# Patient Record
Sex: FEMALE | Race: ASIAN | NOT HISPANIC OR LATINO | Employment: OTHER | URBAN - METROPOLITAN AREA
[De-identification: names, ages, dates, MRNs, and addresses within clinical notes are randomized per-mention and may not be internally consistent; named-entity substitution may affect disease eponyms.]

---

## 2022-01-14 ENCOUNTER — TELEPHONE (OUTPATIENT)
Dept: GASTROENTEROLOGY | Facility: CLINIC | Age: 72
End: 2022-01-14

## 2022-01-14 DIAGNOSIS — B18.1 CHRONIC VIRAL HEPATITIS B WITHOUT DELTA AGENT AND WITHOUT COMA: Primary | ICD-10-CM

## 2022-01-14 RX ORDER — LEVOTHYROXINE SODIUM 50 UG/1
50 TABLET ORAL DAILY
COMMUNITY
Start: 2021-11-24

## 2022-01-14 RX ORDER — ALENDRONATE SODIUM 70 MG/1
70 TABLET ORAL
COMMUNITY
Start: 2021-11-24

## 2022-01-14 RX ORDER — EMPAGLIFLOZIN 10 MG/1
TABLET, FILM COATED ORAL
COMMUNITY
Start: 2022-01-10

## 2022-01-14 RX ORDER — LOSARTAN POTASSIUM 25 MG/1
12.5 TABLET ORAL DAILY
COMMUNITY
Start: 2021-11-24 | End: 2023-05-11

## 2022-01-14 RX ORDER — ENTECAVIR 0.5 MG/1
0.5 TABLET, FILM COATED ORAL DAILY
COMMUNITY
Start: 2021-11-29 | End: 2022-08-08

## 2022-01-14 RX ORDER — SPIRONOLACTONE 25 MG/1
12.5 TABLET ORAL DAILY
COMMUNITY
Start: 2022-01-10 | End: 2023-05-11

## 2022-01-14 RX ORDER — APIXABAN 5 MG/1
5 TABLET, FILM COATED ORAL 2 TIMES DAILY
COMMUNITY
Start: 2021-11-24

## 2022-01-14 RX ORDER — ATENOLOL 50 MG/1
25 TABLET ORAL DAILY
COMMUNITY
Start: 2021-11-24 | End: 2023-05-11

## 2022-01-14 RX ORDER — FUROSEMIDE 20 MG/1
40 TABLET ORAL DAILY
COMMUNITY
Start: 2022-01-10

## 2022-08-08 DIAGNOSIS — B18.1 CHRONIC VIRAL HEPATITIS B WITHOUT DELTA AGENT AND WITHOUT COMA: Primary | ICD-10-CM

## 2022-08-10 ENCOUNTER — TELEPHONE (OUTPATIENT)
Dept: GASTROENTEROLOGY | Facility: CLINIC | Age: 72
End: 2022-08-10

## 2022-08-10 NOTE — TELEPHONE ENCOUNTER
Pt notified of need to go to path lab for lab work. Also notified of us that has been ordered and she will be receiving a call from scheduling. Also appt will be scheduled here for f/u in 2-3 months.    
room air

## 2022-08-15 LAB
ABS NRBC COUNT: 0 X 10 3/UL (ref 0–0.01)
ABSOLUTE BASOPHIL: 0.05 X 10 3/UL (ref 0–0.22)
ABSOLUTE EOSINOPHIL: 0.15 X 10 3/UL (ref 0.04–0.54)
ABSOLUTE IMMATURE GRAN: 0 X 10 3/UL (ref 0–0.04)
ABSOLUTE LYMPHOCYTE: 0.75 X 10 3/UL (ref 0.86–4.75)
ABSOLUTE MONOCYTE: 0.39 X 10 3/UL (ref 0.22–1.08)
ALBUMIN SERPL-MCNC: 4.8 G/DL (ref 3.5–5.2)
ALBUMIN/GLOB SERPL ELPH: 1.5 {RATIO} (ref 1–2.7)
ALP ISOS SERPL LEV INH-CCNC: 105 U/L (ref 35–105)
ALPHA FETOPROTEIN MATERNAL: 3.97 NG/ML (ref 0–8.3)
ALT (SGPT): 19 U/L (ref 0–33)
ANION GAP SERPL CALC-SCNC: 9 MMOL/L (ref 8–17)
AST SERPL-CCNC: 31 U/L (ref 0–32)
BASOPHILS NFR BLD: 1.5 % (ref 0.2–1.2)
BILIRUBIN, TOTAL: 0.6 MG/DL (ref 0–1.2)
BUN/CREAT SERPL: 18.4 (ref 6–20)
CALCIUM SERPL-MCNC: 9.5 MG/DL (ref 8.6–10.2)
CARBON DIOXIDE, CO2: 33 MMOL/L (ref 22–29)
CHLORIDE: 96 MMOL/L (ref 98–107)
CREAT SERPL-MCNC: 0.68 MG/DL (ref 0.5–0.9)
EOSINOPHIL NFR BLD: 4.6 % (ref 0.7–7)
GFR ESTIMATION: 85.29
GLOBULIN: 3.2 G/DL (ref 1.5–4.5)
GLUCOSE: 108 MG/DL (ref 82–115)
HCT VFR BLD AUTO: 42.6 % (ref 37–47)
HGB BLD-MCNC: 14.4 G/DL (ref 12–16)
IMMATURE GRANULOCYTES: 0 % (ref 0–0.5)
LYMPHOCYTES NFR BLD: 23 % (ref 19.3–53.1)
MCH RBC QN AUTO: 32.6 PG (ref 27–32)
MCHC RBC AUTO-ENTMCNC: 33.8 G/DL (ref 32–36)
MCV RBC AUTO: 96.4 FL (ref 82–100)
MONOCYTES NFR BLD: 12 % (ref 4.7–12.5)
NEUTROPHILS # BLD AUTO: 1.92 X 10 3/UL (ref 2.15–7.56)
NEUTROPHILS NFR BLD: 58.9 % (ref 34–71.1)
NUCLEATED RED BLOOD CELLS: 0 /100 WBC (ref 0–0.2)
PLATELET # BLD AUTO: 229 X 10 3/UL (ref 135–400)
POTASSIUM: 4.3 MMOL/L (ref 3.5–5.1)
PROT SNV-MCNC: 8 G/DL (ref 6.4–8.3)
RBC # BLD AUTO: 4.42 X 10 6/UL (ref 4.2–5.4)
RDW-SD: 51.4 FL (ref 37–54)
SODIUM: 138 MMOL/L (ref 136–145)
UREA NITROGEN (BUN): 12.5 MG/DL (ref 8–23)
WBC # BLD: 3.26 X 10 3/UL (ref 4.3–10.8)

## 2022-08-15 NOTE — PROGRESS NOTES
Call patient-CBC, CMP, AFP ok-awaiting U/S of abdomen-repeat CBC, CMP in  4 mo.  Repeat AFP and U/S in 6 mo.

## 2022-08-16 DIAGNOSIS — B18.1 CHRONIC VIRAL HEPATITIS B WITHOUT DELTA AGENT AND WITHOUT COMA: Primary | ICD-10-CM

## 2022-10-17 ENCOUNTER — OFFICE VISIT (OUTPATIENT)
Dept: GASTROENTEROLOGY | Facility: CLINIC | Age: 72
End: 2022-10-17
Payer: MEDICARE

## 2022-10-17 VITALS
BODY MASS INDEX: 16.76 KG/M2 | SYSTOLIC BLOOD PRESSURE: 121 MMHG | WEIGHT: 98.19 LBS | OXYGEN SATURATION: 95 % | TEMPERATURE: 98 F | HEART RATE: 50 BPM | HEIGHT: 64 IN | DIASTOLIC BLOOD PRESSURE: 82 MMHG

## 2022-10-17 DIAGNOSIS — Z12.11 SCREENING FOR COLON CANCER: ICD-10-CM

## 2022-10-17 DIAGNOSIS — B18.1 CHRONIC VIRAL HEPATITIS B WITHOUT DELTA AGENT AND WITHOUT COMA: Primary | ICD-10-CM

## 2022-10-17 PROCEDURE — 99214 OFFICE O/P EST MOD 30 MIN: CPT | Mod: S$GLB,,, | Performed by: INTERNAL MEDICINE

## 2022-10-17 PROCEDURE — 99214 PR OFFICE/OUTPT VISIT, EST, LEVL IV, 30-39 MIN: ICD-10-PCS | Mod: S$GLB,,, | Performed by: INTERNAL MEDICINE

## 2022-10-17 RX ORDER — POTASSIUM CHLORIDE 750 MG/1
TABLET, EXTENDED RELEASE ORAL
COMMUNITY
Start: 2022-09-20

## 2022-10-17 RX ORDER — SACUBITRIL AND VALSARTAN 24; 26 MG/1; MG/1
TABLET, FILM COATED ORAL
COMMUNITY
Start: 2022-08-15

## 2022-10-17 NOTE — PATIENT INSTRUCTIONS
Will repeat ultrasound and blood work in 4 months.   Let our office know if you would like to proceed with colonoscopy.

## 2022-10-17 NOTE — LETTER
October 17, 2022        Marbin Santamaria MD  [address] [facility]  [address]  [facility]  [place] [address]             [place] [facility]  [address] [address]  Phone: [phone]  Fax: [phone]   Patient: Louisa Feliciano   MR Number: [id number]   YOB: 1950   Date of Visit: 10/17/2022       Dear Dr. Santamaria:    Thank you for referring Louisa Feliciano to me for evaluation. Attached you will find relevant portions of my assessment and plan of care.    If you have questions, please do not hesitate to call me. I look forward to following Louisa Feliciano along with you.    Sincerely,      Daquan Schreiber MD            CC  No Recipients    Enclosure

## 2022-10-17 NOTE — PROGRESS NOTES
Clinic Note    Reason for visit:  The primary encounter diagnosis was Chronic viral hepatitis B without delta agent and without coma. A diagnosis of Screening for colon cancer was also pertinent to this visit.    PCP: Marbin Santamaria       HPI:  This is a 72 y.o. female who is here for a follow up. Patient with chronic Hep B, on entecavir for life. She reports doing well. No abdominal pain or blood in stool. She has aortic valve leaking and will have valve replacement.     She is due for colonoscopy-last one was incomplete 2016.    8/24/2022: US normal, AFP normal. Repeat US and AFP in 6 months  8/15/2022: CBC, CMP, AFP ok-repeat CBC, CMP in  4 mo.  Repeat AFP and U/S in 6 mo.    Review of Systems   Constitutional:  Negative for chills, diaphoresis, fatigue, fever and unexpected weight change.   HENT:  Negative for mouth sores, nosebleeds, postnasal drip, sore throat, trouble swallowing and voice change.    Eyes:  Negative for pain, discharge and eye dryness.   Respiratory:  Negative for apnea, cough, choking, chest tightness, shortness of breath and wheezing.    Cardiovascular:  Negative for chest pain, palpitations, leg swelling and claudication.   Gastrointestinal:  Negative for abdominal distention, abdominal pain, anal bleeding, blood in stool, change in bowel habit, constipation, diarrhea, nausea, rectal pain, vomiting, reflux, fecal incontinence and change in bowel habit.   Genitourinary:  Negative for bladder incontinence, difficulty urinating, dysuria, flank pain, frequency and hematuria.   Musculoskeletal:  Negative for arthralgias, back pain, joint swelling and joint deformity.   Integumentary:  Negative for color change, rash and wound.   Allergic/Immunologic: Negative for environmental allergies and food allergies.   Neurological:  Negative for seizures, facial asymmetry, speech difficulty, weakness, headaches and memory loss.   Hematological:  Negative for adenopathy. Does not bruise/bleed easily.  "  Psychiatric/Behavioral:  Negative for agitation, behavioral problems, confusion, hallucinations and sleep disturbance.       Past Medical History:   Diagnosis Date    Back complaints     Heart valve problem     Hypothyroidism, unspecified     Osteoporosis     Unspecified viral hepatitis B without hepatic coma      Past Surgical History:   Procedure Laterality Date    CHOLECYSTECTOMY      COLONOSCOPY      INGUINAL HERNIA REPAIR      UPPER GASTROINTESTINAL ENDOSCOPY       Family History   Problem Relation Age of Onset    Ulcerative colitis Neg Hx     Colon cancer Neg Hx     Crohn's disease Neg Hx     Stomach cancer Neg Hx     Pancreatic cancer Neg Hx     Throat cancer Neg Hx      Social History     Tobacco Use    Smoking status: Never     Passive exposure: Never    Smokeless tobacco: Never   Substance Use Topics    Alcohol use: Never    Drug use: Never     Review of patient's allergies indicates:  No Known Allergies     Medication List with Changes/Refills   Current Medications    ALENDRONATE (FOSAMAX) 70 MG TABLET    Take 70 mg by mouth every 7 days.    ATENOLOL (TENORMIN) 50 MG TABLET    Take 25 mg by mouth once daily.    ELIQUIS 5 MG TAB    Take 5 mg by mouth 2 (two) times daily.    ENTECAVIR (BARACLUDE) 0.5 MG TAB    TAKE ONE TABLET BY MOUTH ONE TIME DAILY    ENTRESTO 24-26 MG PER TABLET    TAKE 1/2 (ONE-HALF) TABLET BY MOUTH TWICE DAILY    FUROSEMIDE (LASIX) 20 MG TABLET    Take 40 mg by mouth once daily.    JARDIANCE 10 MG TABLET    TAKE 1 TABLET BY MOUTH IN THE MORNING FOR 90 DAYS    LEVOTHYROXINE (SYNTHROID) 50 MCG TABLET    Take 50 mcg by mouth once daily.    LOSARTAN (COZAAR) 25 MG TABLET    Take 12.5 mg by mouth once daily.    POTASSIUM CHLORIDE (KLOR-CON) 10 MEQ TBSR        SPIRONOLACTONE (ALDACTONE) 25 MG TABLET    Take 12.5 mg by mouth once daily.         Vital Signs:  /82   Pulse (!) 50   Temp 98.1 °F (36.7 °C)   Ht 5' 4" (1.626 m)   Wt 44.5 kg (98 lb 3.2 oz)   SpO2 95%   BMI 16.86 kg/m²   "       Physical Exam  Vitals reviewed.   Constitutional:       General: She is awake. She is not in acute distress.     Appearance: Normal appearance. She is well-developed. She is not ill-appearing, toxic-appearing or diaphoretic.      Comments: Back brace   HENT:      Head: Normocephalic and atraumatic.      Nose: Nose normal.      Mouth/Throat:      Mouth: Mucous membranes are moist.      Pharynx: Oropharynx is clear. No oropharyngeal exudate or posterior oropharyngeal erythema.   Eyes:      General: Lids are normal. Gaze aligned appropriately. No scleral icterus.        Right eye: No discharge.         Left eye: No discharge.      Extraocular Movements: Extraocular movements intact.      Conjunctiva/sclera: Conjunctivae normal.   Neck:      Trachea: Trachea normal.   Cardiovascular:      Rate and Rhythm: Normal rate and regular rhythm.      Pulses:           Radial pulses are 2+ on the right side and 2+ on the left side.   Pulmonary:      Effort: Pulmonary effort is normal. No respiratory distress.      Breath sounds: Normal breath sounds. No stridor. No wheezing or rhonchi.   Chest:      Chest wall: No tenderness.   Abdominal:      General: Bowel sounds are normal. There is no distension.      Palpations: Abdomen is soft. There is no fluid wave, hepatomegaly or mass.      Tenderness: There is no abdominal tenderness. There is no guarding or rebound.   Musculoskeletal:         General: No tenderness or deformity.      Cervical back: Full passive range of motion without pain and neck supple. No tenderness.      Right lower leg: No edema.      Left lower leg: No edema.   Lymphadenopathy:      Cervical: No cervical adenopathy.   Skin:     General: Skin is warm and dry.      Capillary Refill: Capillary refill takes less than 2 seconds.      Coloration: Skin is not cyanotic, jaundiced or pale.      Findings: No rash.   Neurological:      General: No focal deficit present.      Mental Status: She is alert and oriented  to person, place, and time.      Cranial Nerves: No facial asymmetry.      Motor: No tremor.   Psychiatric:         Attention and Perception: Attention normal.         Mood and Affect: Mood and affect normal.         Speech: Speech normal.         Behavior: Behavior normal. Behavior is cooperative.          All of the data above and below has been reviewed by myself and any further interpretations will be reflected in the assessment and plan.   The data includes review of external notes, and independent interpretation of lab results, procedures, x-rays, and imaging reports.      Assessment:  Chronic viral hepatitis B without delta agent and without coma    Screening for colon cancer    Patient states she would like to defer screening colonoscopy at this time due to her back having pain.      Recommendations:  Will repeat ultrasound and blood work in 4 months.   Let our office know if you would like to proceed with colonoscopy.     Risks, benefits, and alternatives of medical management, any associated procedures, and/or treatment discussed with the patient. Patient given opportunity to ask questions and voices understanding. Patient has elected to proceed with the recommended care modalities as discussed.    Follow up in about 6 months (around 4/17/2023).    Order summary:  No orders of the defined types were placed in this encounter.       Instructed patient to notify my office if they have not been contacted within two weeks after any procedures, submitting any samples (biopsies, blood, stool, urine, etc.) or after any imaging (X-ray, CT, MRI, etc.).     Daquan Schreiber MD    This document may have been created using a voice recognition transcribing system. Incorrect words or phrases may have been missed during proofreading. Please interpret accordingly or contact me for clarification.

## 2022-11-16 NOTE — TELEPHONE ENCOUNTER
----- Message from Chrissy Aguero sent at 11/16/2022  2:44 PM CST -----  Regarding: med refill  Contact: Domniique/daughter  Type:  RX Refill Request    Who Called:  Dominique/daughter   Refill or New Rx: refill   RX Name and Strength: entecavir (BARACLUDE) 0.5 MG Tab  How is the patient currently taking it? (ex. 1XDay): 1x day   Is this a 30 day or 90 day RX:90 day   Preferred Pharmacy with phone number:     [facility] #[id number] - [facility] [address] [address]  Phone: [phone] Fax: [phone]    Local or Mail Order: local   Ordering Provider: Candie   Would the patient rather a call back or a response via [facility]?  Call back   Best Call Back Number:[phone]  Additional Information:

## 2022-11-17 RX ORDER — ENTECAVIR 0.5 MG/1
0.5 TABLET, FILM COATED ORAL DAILY
Qty: 90 TABLET | Refills: 3 | Status: SHIPPED | OUTPATIENT
Start: 2022-11-17 | End: 2023-11-06 | Stop reason: SDUPTHER

## 2023-05-11 ENCOUNTER — OFFICE VISIT (OUTPATIENT)
Dept: GASTROENTEROLOGY | Facility: CLINIC | Age: 73
End: 2023-05-11
Payer: MEDICARE

## 2023-05-11 VITALS
BODY MASS INDEX: 17.07 KG/M2 | WEIGHT: 100 LBS | HEIGHT: 64 IN | DIASTOLIC BLOOD PRESSURE: 64 MMHG | HEART RATE: 69 BPM | SYSTOLIC BLOOD PRESSURE: 119 MMHG

## 2023-05-11 DIAGNOSIS — B18.1 CHRONIC VIRAL HEPATITIS B WITHOUT DELTA AGENT AND WITHOUT COMA: ICD-10-CM

## 2023-05-11 DIAGNOSIS — Z12.11 SCREENING FOR COLON CANCER: Primary | ICD-10-CM

## 2023-05-11 PROCEDURE — 99214 OFFICE O/P EST MOD 30 MIN: CPT | Mod: S$GLB,,, | Performed by: INTERNAL MEDICINE

## 2023-05-11 PROCEDURE — 99214 PR OFFICE/OUTPT VISIT, EST, LEVL IV, 30-39 MIN: ICD-10-PCS | Mod: S$GLB,,, | Performed by: INTERNAL MEDICINE

## 2023-05-11 RX ORDER — TRIAMCINOLONE ACETONIDE 1 MG/G
CREAM TOPICAL
COMMUNITY
Start: 2022-12-30

## 2023-05-11 RX ORDER — CLOTRIMAZOLE AND BETAMETHASONE DIPROPIONATE 10; .64 MG/G; MG/G
CREAM TOPICAL
COMMUNITY
Start: 2023-01-05

## 2023-05-11 NOTE — PROGRESS NOTES
Clinic Note    Reason for visit:  The primary encounter diagnosis was Screening for colon cancer. A diagnosis of Chronic viral hepatitis B without delta agent and without coma was also pertinent to this visit.    PCP: Marbin Santamaria       HPI:  This is a 73 y.o. female here for follow up. Chronic HBV on enecavir.    She is due for colonoscopy-last one was incomplete 2016.    8/24/2022: US normal, AFP normal. Repeat US and AFP in 6 months  8/15/2022: CBC, CMP, AFP ok-repeat CBC, CMP in  4 mo.  Repeat AFP and U/S in 6 mos.    Review of Systems   Constitutional:  Negative for fatigue, fever and unexpected weight change.   HENT:  Negative for mouth sores, postnasal drip, sore throat and trouble swallowing.    Eyes:  Negative for pain, discharge and eye dryness.   Respiratory:  Negative for apnea, cough, choking, chest tightness, shortness of breath and wheezing.    Cardiovascular:  Negative for chest pain, palpitations and leg swelling.   Gastrointestinal:  Negative for abdominal distention, abdominal pain, anal bleeding, blood in stool, change in bowel habit, constipation, diarrhea, nausea, rectal pain, vomiting, reflux, fecal incontinence and change in bowel habit.   Genitourinary:  Negative for bladder incontinence, dysuria and hematuria.   Musculoskeletal:  Negative for arthralgias, back pain and joint swelling.   Integumentary:  Negative for color change and rash.   Allergic/Immunologic: Negative for environmental allergies and food allergies.   Neurological:  Negative for seizures and headaches.   Hematological:  Negative for adenopathy. Does not bruise/bleed easily.      Past Medical History:   Diagnosis Date    Back complaints     Heart valve problem     Hypothyroidism, unspecified     Osteoporosis     Unspecified viral hepatitis B without hepatic coma      Past Surgical History:   Procedure Laterality Date    CHOLECYSTECTOMY      COLONOSCOPY      INGUINAL HERNIA REPAIR      STENT, AORTA      UPPER  "GASTROINTESTINAL ENDOSCOPY       Family History   Problem Relation Age of Onset    Ulcerative colitis Neg Hx     Colon cancer Neg Hx     Crohn's disease Neg Hx     Stomach cancer Neg Hx     Pancreatic cancer Neg Hx     Throat cancer Neg Hx      Social History     Tobacco Use    Smoking status: Never     Passive exposure: Never    Smokeless tobacco: Never   Substance Use Topics    Alcohol use: Never    Drug use: Never     Review of patient's allergies indicates:  No Known Allergies     Medication List with Changes/Refills   Current Medications    ALENDRONATE (FOSAMAX) 70 MG TABLET    Take 70 mg by mouth every 7 days.    CLOTRIMAZOLE-BETAMETHASONE 1-0.05% (LOTRISONE) CREAM    APPLY TOPICALLY TWICE A DAY AS NEEDED    ELIQUIS 5 MG TAB    Take 5 mg by mouth 2 (two) times daily.    ENTECAVIR (BARACLUDE) 0.5 MG TAB    Take 1 tablet (0.5 mg total) by mouth once daily.    ENTRESTO 24-26 MG PER TABLET    TAKE 1/2 (ONE-HALF) TABLET BY MOUTH TWICE DAILY    FUROSEMIDE (LASIX) 20 MG TABLET    Take 40 mg by mouth once daily.    JARDIANCE 10 MG TABLET    TAKE 1 TABLET BY MOUTH IN THE MORNING FOR 90 DAYS    LEVOTHYROXINE (SYNTHROID) 50 MCG TABLET    Take 50 mcg by mouth once daily.    POTASSIUM CHLORIDE (KLOR-CON) 10 MEQ TBSR        TRIAMCINOLONE ACETONIDE 0.1% (KENALOG) 0.1 % CREAM    APPLY CREAM EXTERNALLY TO AFFECTED AREA TWICE DAILY   Discontinued Medications    ATENOLOL (TENORMIN) 50 MG TABLET    Take 25 mg by mouth once daily.    ENTECAVIR (BARACLUDE) 0.5 MG TAB    TAKE ONE TABLET BY MOUTH ONE TIME DAILY    LOSARTAN (COZAAR) 25 MG TABLET    Take 12.5 mg by mouth once daily.    SPIRONOLACTONE (ALDACTONE) 25 MG TABLET    Take 12.5 mg by mouth once daily.         Vital Signs:  /64   Pulse 69   Ht 5' 4" (1.626 m)   Wt 45.4 kg (100 lb)   BMI 17.16 kg/m²         Physical Exam  Vitals reviewed.   Constitutional:       General: She is awake. She is not in acute distress.     Appearance: Normal appearance. She is " well-developed. She is not ill-appearing, toxic-appearing or diaphoretic.   HENT:      Head: Normocephalic and atraumatic.      Nose: Nose normal.      Mouth/Throat:      Mouth: Mucous membranes are moist.      Pharynx: Oropharynx is clear. No oropharyngeal exudate or posterior oropharyngeal erythema.   Eyes:      General: Lids are normal. Gaze aligned appropriately. No scleral icterus.        Right eye: No discharge.         Left eye: No discharge.      Conjunctiva/sclera: Conjunctivae normal.   Neck:      Trachea: Trachea normal.   Cardiovascular:      Rate and Rhythm: Normal rate and regular rhythm.      Pulses:           Radial pulses are 2+ on the right side and 2+ on the left side.   Pulmonary:      Effort: Pulmonary effort is normal. No respiratory distress.      Breath sounds: No stridor. No wheezing.   Chest:      Chest wall: No tenderness.   Abdominal:      General: Bowel sounds are normal. There is no distension.      Palpations: Abdomen is soft. There is no fluid wave, hepatomegaly or mass.      Tenderness: There is no abdominal tenderness. There is no guarding or rebound.   Musculoskeletal:         General: No tenderness or deformity.      Cervical back: Full passive range of motion without pain and neck supple. No tenderness.      Right lower leg: No edema.      Left lower leg: No edema.   Lymphadenopathy:      Cervical: No cervical adenopathy.   Skin:     General: Skin is warm and dry.      Capillary Refill: Capillary refill takes less than 2 seconds.      Coloration: Skin is not cyanotic, jaundiced or pale.   Neurological:      General: No focal deficit present.      Mental Status: She is alert and oriented to person, place, and time.      Motor: No tremor.   Psychiatric:         Attention and Perception: Attention normal.         Mood and Affect: Mood and affect normal.         Speech: Speech normal.         Behavior: Behavior normal. Behavior is cooperative.          All of the data above and below  has been reviewed by myself and any further interpretations will be reflected in the assessment and plan.   The data includes review of external notes, and independent interpretation of lab results, procedures, x-rays, and imaging reports.      Assessment:  Screening for colon cancer    Chronic viral hepatitis B without delta agent and without coma    Chronic HBV- continue w/ entecavir daily. Due for labs work for HBV monitoring/HCC screening. Pt. Does not understand reasoning for labs and US. Sent education/instructions on D/C paperwork :  Needs to have labwork to assess if hepatitis B is active. It is important to have labwork as well to see if liver is functioning correctly. We will also do an Abdominal ultrasound to evaluate liver. We do this so we can see if you have any damage to your liver and to look for any time of liver cancers. Because you have hepatitis B you are increased risk for both of these.    Overdue for Colonoscopy- patient refuses at this time but will call us when ready to get done.     Recommendations:    Continue entecavir  Complete labs and US    Risks, benefits, and alternatives of medical management, any associated procedures, and/or treatment discussed with the patient. Patient given opportunity to ask questions and voices understanding. Patient has elected to proceed with the recommended care modalities as discussed.    Follow up in about 6 months (around 11/11/2023).    Order summary:  No orders of the defined types were placed in this encounter.       Instructed patient to notify my office if they have not been contacted within two weeks after any procedures, submitting any samples (biopsies, blood, stool, urine, etc.) or after any imaging (X-ray, CT, MRI, etc.).     Daquan Schreiber MD    This document may have been created using a voice recognition transcribing system. Incorrect words or phrases may have been missed during proofreading. Please interpret accordingly or contact me for  clarification.

## 2023-05-11 NOTE — PATIENT INSTRUCTIONS
Continue entecavir  Complete labs and US- Needs to have labwork to assess if hepatitis B is active. It is important to have labwork as well to see if liver is functioning correctly. We will also do an Abdominal ultrasound to evaluate liver. We do this so we can see if you have any damage to your liver and to look for any time of liver cancers. Because you have hepatitis B you are increased risk for both of these.  Call us when ready to have Colonsocopy    Please notify my office if you have not been contacted within two weeks after any procedures, submitting any samples (biopsies, blood, stool, urine, etc.) or after any imaging (X-ray, CT, MRI, etc.).

## 2023-05-12 LAB
ABS NRBC COUNT: 0 X 10 3/UL (ref 0–0.01)
ABSOLUTE BASOPHIL: 0.03 X 10 3/UL (ref 0–0.22)
ABSOLUTE EOSINOPHIL: 0.19 X 10 3/UL (ref 0.04–0.54)
ABSOLUTE IMMATURE GRAN: 0.01 X 10 3/UL (ref 0–0.04)
ABSOLUTE LYMPHOCYTE: 0.92 X 10 3/UL (ref 0.86–4.75)
ABSOLUTE MONOCYTE: 0.46 X 10 3/UL (ref 0.22–1.08)
ALBUMIN SERPL-MCNC: 4.9 G/DL (ref 3.5–5.2)
ALBUMIN/GLOB SERPL ELPH: 1.3 {RATIO} (ref 1–2.7)
ALP ISOS SERPL LEV INH-CCNC: 84 U/L (ref 35–105)
ALPHA FETOPROTEIN MATERNAL: 3.87 NG/ML (ref 0–8.3)
ALT (SGPT): 21 U/L (ref 0–33)
ANION GAP SERPL CALC-SCNC: 13 MMOL/L (ref 8–17)
AST SERPL-CCNC: 33 U/L (ref 0–32)
BASOPHILS NFR BLD: 0.8 % (ref 0.2–1.2)
BILIRUBIN, TOTAL: 0.55 MG/DL (ref 0–1.2)
BUN/CREAT SERPL: 29.5 (ref 6–20)
CALCIUM SERPL-MCNC: 9.7 MG/DL (ref 8.6–10.2)
CARBON DIOXIDE, CO2: 28 MMOL/L (ref 22–29)
CHLORIDE: 97 MMOL/L (ref 98–107)
CREAT SERPL-MCNC: 0.66 MG/DL (ref 0.5–0.9)
EOSINOPHIL NFR BLD: 5.1 % (ref 0.7–7)
GFR ESTIMATION: 90.22
GLOBULIN: 3.7 G/DL (ref 1.5–4.5)
GLUCOSE: 94 MG/DL (ref 82–115)
HBV SURFACE AG SERPL QL IA: REACTIVE
HCT VFR BLD AUTO: 45.1 % (ref 37–47)
HEP BSAG CONFIRMATION: ABNORMAL
HGB BLD-MCNC: 14.5 G/DL (ref 12–16)
IMMATURE GRANULOCYTES: 0.3 % (ref 0–0.5)
LYMPHOCYTES NFR BLD: 24.5 % (ref 19.3–53.1)
MCH RBC QN AUTO: 31.1 PG (ref 27–32)
MCHC RBC AUTO-ENTMCNC: 32.2 G/DL (ref 32–36)
MCV RBC AUTO: 96.8 FL (ref 82–100)
MONOCYTES NFR BLD: 12.3 % (ref 4.7–12.5)
NEUTROPHILS # BLD AUTO: 2.14 X 10 3/UL (ref 2.15–7.56)
NEUTROPHILS NFR BLD: 57 % (ref 34–71.1)
NUCLEATED RED BLOOD CELLS: 0 /100 WBC (ref 0–0.2)
PLATELET # BLD AUTO: 237 X 10 3/UL (ref 135–400)
POTASSIUM: 4.2 MMOL/L (ref 3.5–5.1)
PROT SNV-MCNC: 8.6 G/DL (ref 6.4–8.3)
RBC # BLD AUTO: 4.66 X 10 6/UL (ref 4.2–5.4)
RDW-SD: 52.7 FL (ref 37–54)
SODIUM: 138 MMOL/L (ref 136–145)
UREA NITROGEN (BUN): 19.5 MG/DL (ref 8–23)
WBC # BLD: 3.75 X 10 3/UL (ref 4.3–10.8)

## 2023-05-15 NOTE — PROGRESS NOTES
Call with results,afp normal, CBC normal, CMP ok-mild increase in AST-33-normal is [id number] CBC, CMP, AFP in 6mo. Along with abdominal U/S-pending current abdominal U/S.

## 2023-05-23 ENCOUNTER — TELEPHONE (OUTPATIENT)
Dept: GASTROENTEROLOGY | Facility: CLINIC | Age: 73
End: 2023-05-23
Payer: MEDICARE

## 2023-05-23 NOTE — TELEPHONE ENCOUNTER
I spoke to daughter and gave test results and instructed to have patient lynn las in 6 months.  Is scheduled for /S Thursday.

## 2023-05-23 NOTE — TELEPHONE ENCOUNTER
----- Message from aDquan Schreiber MD sent at 5/15/2023 10:06 AM CDT -----  Call with results,afp normal, CBC normal, CMP ok-mild increase in AST-33-normal is [id number] CBC, CMP, AFP in 6mo. Along with abdominal U/S-pending current abdominal U/S.

## 2023-06-05 ENCOUNTER — TELEPHONE (OUTPATIENT)
Dept: GASTROENTEROLOGY | Facility: CLINIC | Age: 73
End: 2023-06-05
Payer: MEDICARE

## 2023-06-05 NOTE — TELEPHONE ENCOUNTER
----- Message from Daquan Schreiber MD sent at 6/5/2023  7:25 AM CDT -----  Call with results,U/S shows liver ok-repeat U/S in 6mo. Along with AFP.

## 2023-06-05 NOTE — TELEPHONE ENCOUNTER
Patient given results per Dr. Schreiber's chart remarks. Patient gave verbal understanding. Will repeat in 6 months along with AFP. -KG, CMA

## 2023-11-06 ENCOUNTER — OFFICE VISIT (OUTPATIENT)
Dept: GASTROENTEROLOGY | Facility: CLINIC | Age: 73
End: 2023-11-06
Payer: MEDICARE

## 2023-11-06 VITALS
HEIGHT: 64 IN | BODY MASS INDEX: 17.18 KG/M2 | OXYGEN SATURATION: 97 % | HEART RATE: 62 BPM | DIASTOLIC BLOOD PRESSURE: 70 MMHG | SYSTOLIC BLOOD PRESSURE: 120 MMHG | RESPIRATION RATE: 16 BRPM | TEMPERATURE: 98 F | WEIGHT: 100.63 LBS

## 2023-11-06 DIAGNOSIS — B18.1 CHRONIC VIRAL HEPATITIS B WITHOUT DELTA AGENT AND WITHOUT COMA: Primary | ICD-10-CM

## 2023-11-06 DIAGNOSIS — Z12.11 SCREENING FOR COLON CANCER: ICD-10-CM

## 2023-11-06 PROCEDURE — 99214 OFFICE O/P EST MOD 30 MIN: CPT | Mod: S$GLB,,, | Performed by: INTERNAL MEDICINE

## 2023-11-06 PROCEDURE — 99214 PR OFFICE/OUTPT VISIT, EST, LEVL IV, 30-39 MIN: ICD-10-PCS | Mod: S$GLB,,, | Performed by: INTERNAL MEDICINE

## 2023-11-06 RX ORDER — ENTECAVIR 0.5 MG/1
0.5 TABLET, FILM COATED ORAL DAILY
Qty: 90 TABLET | Refills: 3 | Status: SHIPPED | OUTPATIENT
Start: 2023-11-06 | End: 2024-11-05

## 2023-11-06 NOTE — PROGRESS NOTES
Clinic Note    Reason for visit:  The primary encounter diagnosis was Chronic viral hepatitis B without delta agent and without coma. A diagnosis of Screening for colon cancer was also pertinent to this visit.    PCP: Marbin Santamaria       HPI:  This is a 73 y.o. female here for follow up. - Jenny used. Patient with chronic HBV on entacavir. Last visit colonoscopy was ordered but not done. She reports itching of her hands and back. She denies confusion, abdominal pain, chest pain.  Patient would like to not do a colonoscopy.     She is due for colonoscopy-last one was incomplete 2016 5/25/2023: ABD US- normal liver    5/11/2023: WBC 3.75L, AST 33H, ALT 21, Tbil 0.5 AFP WNL. HepBsAG +, HepBeAG- neg., HepBDNA <10- not detected.    Review of Systems   Constitutional:  Negative for fatigue, fever and unexpected weight change.   HENT:  Negative for mouth sores, postnasal drip, sore throat and trouble swallowing.    Eyes:  Negative for pain, discharge and eye dryness.   Respiratory:  Negative for apnea, cough, choking, chest tightness, shortness of breath and wheezing.    Cardiovascular:  Negative for chest pain, palpitations and leg swelling.   Gastrointestinal:  Negative for abdominal distention, abdominal pain, anal bleeding, blood in stool, change in bowel habit, constipation, diarrhea, nausea, rectal pain, vomiting, reflux and fecal incontinence.   Genitourinary:  Negative for bladder incontinence, dysuria and hematuria.   Musculoskeletal:  Negative for arthralgias, back pain and joint swelling.   Integumentary:  Negative for color change and rash.   Allergic/Immunologic: Negative for environmental allergies and food allergies.   Neurological:  Negative for seizures and headaches.   Hematological:  Negative for adenopathy. Does not bruise/bleed easily.        Past Medical History:   Diagnosis Date    Back complaints     Heart valve problem     Hypothyroidism, unspecified     Osteoporosis      "Unspecified viral hepatitis B without hepatic coma      Past Surgical History:   Procedure Laterality Date    CHOLECYSTECTOMY      COLONOSCOPY      INGUINAL HERNIA REPAIR      STENT, AORTA      UPPER GASTROINTESTINAL ENDOSCOPY       Family History   Problem Relation Age of Onset    Ulcerative colitis Neg Hx     Colon cancer Neg Hx     Crohn's disease Neg Hx     Stomach cancer Neg Hx     Pancreatic cancer Neg Hx     Throat cancer Neg Hx      Social History     Tobacco Use    Smoking status: Never     Passive exposure: Never    Smokeless tobacco: Never   Substance Use Topics    Alcohol use: Never    Drug use: Never     Review of patient's allergies indicates:  No Known Allergies     Medication List with Changes/Refills   Current Medications    ALENDRONATE (FOSAMAX) 70 MG TABLET    Take 70 mg by mouth every 7 days.    CLOTRIMAZOLE-BETAMETHASONE 1-0.05% (LOTRISONE) CREAM    APPLY TOPICALLY TWICE A DAY AS NEEDED    ELIQUIS 5 MG TAB    Take 5 mg by mouth 2 (two) times daily.    ENTRESTO 24-26 MG PER TABLET    TAKE 1/2 (ONE-HALF) TABLET BY MOUTH TWICE DAILY    FUROSEMIDE (LASIX) 20 MG TABLET    Take 40 mg by mouth once daily.    JARDIANCE 10 MG TABLET    TAKE 1 TABLET BY MOUTH IN THE MORNING FOR 90 DAYS    LEVOTHYROXINE (SYNTHROID) 50 MCG TABLET    Take 50 mcg by mouth once daily.    POTASSIUM CHLORIDE (KLOR-CON) 10 MEQ TBSR        TRIAMCINOLONE ACETONIDE 0.1% (KENALOG) 0.1 % CREAM    APPLY CREAM EXTERNALLY TO AFFECTED AREA TWICE DAILY   Changed and/or Refilled Medications    Modified Medication Previous Medication    ENTECAVIR (BARACLUDE) 0.5 MG TAB entecavir (BARACLUDE) 0.5 MG Tab       Take 1 tablet (0.5 mg total) by mouth once daily.    Take 1 tablet (0.5 mg total) by mouth once daily.         Vital Signs:  /70 (BP Location: Left arm, Patient Position: Sitting, BP Method: Medium (Automatic))   Pulse 62   Temp 98.3 °F (36.8 °C) (Oral)   Resp 16   Ht 5' 4" (1.626 m)   Wt 45.6 kg (100 lb 9.6 oz)   SpO2 97%   " BMI 17.27 kg/m²         Physical Exam  Vitals reviewed.   Constitutional:       General: She is awake. She is not in acute distress.     Appearance: Normal appearance. She is well-developed. She is not ill-appearing, toxic-appearing or diaphoretic.   HENT:      Head: Normocephalic and atraumatic.      Nose: Nose normal.      Mouth/Throat:      Mouth: Mucous membranes are moist.      Pharynx: Oropharynx is clear. No oropharyngeal exudate or posterior oropharyngeal erythema.   Eyes:      General: Lids are normal. Gaze aligned appropriately. No scleral icterus.        Right eye: No discharge.         Left eye: No discharge.      Conjunctiva/sclera: Conjunctivae normal.   Neck:      Trachea: Trachea normal.   Cardiovascular:      Rate and Rhythm: Normal rate and regular rhythm.      Pulses:           Radial pulses are 2+ on the right side and 2+ on the left side.   Pulmonary:      Effort: Pulmonary effort is normal. No respiratory distress.      Breath sounds: No stridor. No wheezing.   Chest:      Chest wall: No tenderness.   Abdominal:      General: Bowel sounds are normal. There is no distension.      Palpations: Abdomen is soft. There is no fluid wave, hepatomegaly or mass.      Tenderness: There is no abdominal tenderness. There is no guarding or rebound.   Musculoskeletal:         General: No tenderness or deformity.      Cervical back: Full passive range of motion without pain and neck supple. No tenderness.      Right lower leg: No edema.      Left lower leg: No edema.   Lymphadenopathy:      Cervical: No cervical adenopathy.   Skin:     General: Skin is warm and dry.      Capillary Refill: Capillary refill takes less than 2 seconds.      Coloration: Skin is not cyanotic, jaundiced or pale.   Neurological:      General: No focal deficit present.      Mental Status: She is alert and oriented to person, place, and time.      Motor: No tremor.   Psychiatric:         Attention and Perception: Attention normal.          Mood and Affect: Mood and affect normal.         Speech: Speech normal.         Behavior: Behavior normal. Behavior is cooperative.            All of the data above and below has been reviewed by myself and any further interpretations will be reflected in the assessment and plan.   The data includes review of external notes, and independent interpretation of lab results, procedures, x-rays, and imaging reports.      Assessment:  Chronic viral hepatitis B without delta agent and without coma  -     HEPATITIS B VIRAL DNA, QUANTITATIVE; Future; Expected date: 11/06/2023  -     AFP Tumor Marker; Future; Expected date: 11/06/2023  -     CBC Auto Differential; Future; Expected date: 11/06/2023  -     Comprehensive Metabolic Panel; Future; Expected date: 11/06/2023  -     US Abdomen Limited; Future; Expected date: 11/06/2023  -     Ambulatory referral/consult to Gastroenterology; Future; Expected date: 11/13/2023  -     entecavir (BARACLUDE) 0.5 MG Tab; Take 1 tablet (0.5 mg total) by mouth once daily.  Dispense: 90 tablet; Refill: 3    Screening for colon cancer  -     Cologuard Screening (Multitarget Stool DNA); Future; Expected date: 11/06/2023    Needs updated labs/US.  She does not want colonoscopy. She will do Cologuard test.      Recommendations:  Schedule ultrasound of liver.  Complete blood work.   Complete Cologuard.  Referral to Dr. Ishmael Ellison.    Risks, benefits, and alternatives of medical management, any associated procedures, and/or treatment discussed with the patient. Patient given opportunity to ask questions and voices understanding. Patient has elected to proceed with the recommended care modalities as discussed.    Instructed patient to notify my office if they have not been contacted within two weeks after any procedures, submitting any samples (biopsies, blood, stool, urine, etc.) or after any imaging (X-ray, CT, MRI, etc.).     No follow-ups on file.    Order summary:  Orders Placed This  Encounter   Procedures    Cologuard Screening (Multitarget Stool DNA)    US Abdomen Limited    HEPATITIS B VIRAL DNA, QUANTITATIVE    AFP Tumor Marker    CBC Auto Differential    Comprehensive Metabolic Panel    Ambulatory referral/consult to Gastroenterology          This document may have been created using a voice recognition transcribing system. Incorrect words or phrases may have been missed during proofreading. Please interpret accordingly or contact me for clarification.     Daquan Schreiber MD

## 2023-11-06 NOTE — PATIENT INSTRUCTIONS
Schedule ultrasound of liver.  Complete blood work.   Complete Cologuard.  Referral to Dr. Ishmael Ellison.    Please notify my office if you have not been contacted within two weeks after any procedures, submitting any samples (biopsies, blood, stool, urine, etc.) or after any imaging (X-ray, CT, MRI, etc.).

## 2023-11-09 LAB
ABS NRBC COUNT: 0 X 10 3/UL (ref 0–0.01)
ABSOLUTE BASOPHIL: 0.03 X 10 3/UL (ref 0–0.22)
ABSOLUTE EOSINOPHIL: 0.11 X 10 3/UL (ref 0.04–0.54)
ABSOLUTE IMMATURE GRAN: 0.01 X 10 3/UL (ref 0–0.04)
ABSOLUTE LYMPHOCYTE: 0.79 X 10 3/UL (ref 0.86–4.75)
ABSOLUTE MONOCYTE: 0.4 X 10 3/UL (ref 0.22–1.08)
ALBUMIN SERPL-MCNC: 4.7 G/DL (ref 3.5–5.2)
ALBUMIN/GLOB SERPL ELPH: 1.6 {RATIO} (ref 1–2.7)
ALP ISOS SERPL LEV INH-CCNC: 40 U/L (ref 35–105)
ALPHA FETOPROTEIN MATERNAL: 4.65 NG/ML (ref 0–8.3)
ALT (SGPT): 15 U/L (ref 0–33)
ANION GAP SERPL CALC-SCNC: 11 MMOL/L (ref 8–17)
AST SERPL-CCNC: 28 U/L (ref 0–32)
BASOPHILS NFR BLD: 1 % (ref 0.2–1.2)
BILIRUBIN, TOTAL: 0.51 MG/DL (ref 0–1.2)
BUN/CREAT SERPL: 27.9 (ref 6–20)
CALCIUM SERPL-MCNC: 9.8 MG/DL (ref 8.6–10.2)
CARBON DIOXIDE, CO2: 31 MMOL/L (ref 22–29)
CHLORIDE: 91 MMOL/L (ref 98–107)
CREAT SERPL-MCNC: 0.62 MG/DL (ref 0.5–0.9)
EOSINOPHIL NFR BLD: 3.7 % (ref 0.7–7)
GFR ESTIMATION: 94.56 ML/MIN/1.73M2
GLOBULIN: 3 G/DL (ref 1.5–4.5)
GLUCOSE: 98 MG/DL (ref 82–115)
HCT VFR BLD AUTO: 36 % (ref 37–47)
HGB BLD-MCNC: 11.9 G/DL (ref 12–16)
IMMATURE GRANULOCYTES: 0.3 % (ref 0–0.5)
LYMPHOCYTES NFR BLD: 26.2 % (ref 19.3–53.1)
MCH RBC QN AUTO: 31.3 PG (ref 27–32)
MCHC RBC AUTO-ENTMCNC: 33.1 G/DL (ref 32–36)
MCV RBC AUTO: 94.7 FL (ref 82–100)
MONOCYTES NFR BLD: 13.3 % (ref 4.7–12.5)
NEUTROPHILS # BLD AUTO: 1.67 X 10 3/UL (ref 2.15–7.56)
NEUTROPHILS NFR BLD: 55.5 % (ref 34–71.1)
NUCLEATED RED BLOOD CELLS: 0 /100 WBC (ref 0–0.2)
PLATELET # BLD AUTO: 186 X 10 3/UL (ref 135–400)
POTASSIUM: 3.8 MMOL/L (ref 3.5–5.1)
PROT SNV-MCNC: 7.7 G/DL (ref 6.4–8.3)
RBC # BLD AUTO: 3.8 X 10 6/UL (ref 4.2–5.4)
RDW-SD: 53.1 FL (ref 37–54)
SODIUM: 133 MMOL/L (ref 136–145)
UREA NITROGEN (BUN): 17.3 MG/DL (ref 8–23)
WBC # BLD: 3.01 X 10 3/UL (ref 4.3–10.8)

## 2023-11-13 DIAGNOSIS — D64.9 ANEMIA, UNSPECIFIED TYPE: Primary | ICD-10-CM

## 2023-11-13 NOTE — PROGRESS NOTES
Call with results, Hgb 11.9-repeat CBC in 2mo., LFTS ok, AFP ok-pending U/S. Order created for CBC.  Notify patient that I am retiring in December and she will need to find another gastroenterologist-OPTIONS: Reg Sparks Teran

## 2023-11-14 ENCOUNTER — TELEPHONE (OUTPATIENT)
Dept: GASTROENTEROLOGY | Facility: CLINIC | Age: 73
End: 2023-11-14
Payer: MEDICARE

## 2023-11-14 LAB
HEPATITIS B VIRAL DNA IU/ML: NOT DETECTED IU/ML
HEPATITIS B VIRUS DNA: NOT DETECTED LOG IU/ML

## 2023-11-14 NOTE — TELEPHONE ENCOUNTER
Patient notified of results and Dr. Dodge orders and recommendations. Lab order faxed . Reminder sent to myself and to make sure pt sees message./ CP

## 2023-11-15 ENCOUNTER — TELEPHONE (OUTPATIENT)
Dept: GASTROENTEROLOGY | Facility: CLINIC | Age: 73
End: 2023-11-15
Payer: MEDICARE

## 2023-11-15 NOTE — TELEPHONE ENCOUNTER
----- Message from Janis Akbar MA sent at 11/15/2023  1:36 PM CST -----      ----- Message -----  From: Daquan Schreiber MD  Sent: 11/15/2023  12:06 PM CST  To: Janis Akbar MA    Call with results, liver shows no masses-repeat RUQ U/S in 6mo-looks like patient has an appt with Dr. Ellison in January-fax this result to him-thanks

## 2023-11-15 NOTE — TELEPHONE ENCOUNTER
----- Message from Janis Akbar MA sent at 11/15/2023  1:36 PM CST -----      ----- Message -----  From: Daquan Schreiber MD  Sent: 11/15/2023  12:14 PM CST  To: Janis Akbar MA    Call with results, hepatitis B in blood negative-good means drug is working.

## 2023-12-07 LAB — NONINV COLON CA DNA+OCC BLD SCRN STL QL: NEGATIVE

## 2023-12-11 ENCOUNTER — TELEPHONE (OUTPATIENT)
Dept: GASTROENTEROLOGY | Facility: CLINIC | Age: 73
End: 2023-12-11
Payer: MEDICARE

## 2023-12-11 NOTE — TELEPHONE ENCOUNTER
----- Message from Janis Akbar MA sent at 12/11/2023 12:45 PM CST -----      ----- Message -----  From: Daquan Schreiber MD  Sent: 12/7/2023  12:39 PM CST  To: Janis Akbar MA    Call with results, COLOGARD negative-will need repeat COLOGARD in 3 yrs.

## 2024-04-29 ENCOUNTER — TELEPHONE (OUTPATIENT)
Dept: HEPATOLOGY | Facility: CLINIC | Age: 74
End: 2024-04-29
Payer: MEDICARE

## 2024-04-29 ENCOUNTER — PATIENT MESSAGE (OUTPATIENT)
Dept: HEPATOLOGY | Facility: CLINIC | Age: 74
End: 2024-04-29
Payer: MEDICARE

## 2024-04-29 NOTE — TELEPHONE ENCOUNTER
Attempted to contact patient at [phone] with no answer. Left voicemail message for patient to return call.

## 2024-05-13 ENCOUNTER — TELEPHONE (OUTPATIENT)
Dept: HEPATOLOGY | Facility: CLINIC | Age: 74
End: 2024-05-13
Payer: MEDICARE

## 2024-05-13 NOTE — TELEPHONE ENCOUNTER
Attempted to contact patient with no answer. Left voicemail message for patient to return call.     Letter mailed to address on file